# Patient Record
Sex: FEMALE | Race: OTHER | NOT HISPANIC OR LATINO | URBAN - METROPOLITAN AREA
[De-identification: names, ages, dates, MRNs, and addresses within clinical notes are randomized per-mention and may not be internally consistent; named-entity substitution may affect disease eponyms.]

---

## 2019-12-28 ENCOUNTER — EMERGENCY (EMERGENCY)
Facility: HOSPITAL | Age: 39
LOS: 1 days | Discharge: HOME | End: 2019-12-28
Attending: EMERGENCY MEDICINE | Admitting: EMERGENCY MEDICINE
Payer: MEDICAID

## 2019-12-28 VITALS
SYSTOLIC BLOOD PRESSURE: 129 MMHG | HEART RATE: 88 BPM | RESPIRATION RATE: 18 BRPM | TEMPERATURE: 99 F | DIASTOLIC BLOOD PRESSURE: 88 MMHG | OXYGEN SATURATION: 98 %

## 2019-12-28 VITALS
RESPIRATION RATE: 15 BRPM | OXYGEN SATURATION: 99 % | WEIGHT: 182.98 LBS | HEIGHT: 61 IN | DIASTOLIC BLOOD PRESSURE: 92 MMHG | SYSTOLIC BLOOD PRESSURE: 144 MMHG | TEMPERATURE: 99 F | HEART RATE: 83 BPM

## 2019-12-28 LAB
ALBUMIN SERPL ELPH-MCNC: 3.7 G/DL — SIGNIFICANT CHANGE UP (ref 3.4–5)
ALP SERPL-CCNC: 88 U/L — SIGNIFICANT CHANGE UP (ref 40–120)
ALT FLD-CCNC: 60 U/L — HIGH (ref 12–42)
ANION GAP SERPL CALC-SCNC: 9 MMOL/L — SIGNIFICANT CHANGE UP (ref 9–16)
APPEARANCE UR: CLEAR — SIGNIFICANT CHANGE UP
APTT BLD: 36.5 SEC — HIGH (ref 27.5–36.3)
AST SERPL-CCNC: 38 U/L — HIGH (ref 15–37)
BASOPHILS # BLD AUTO: 0.07 K/UL — SIGNIFICANT CHANGE UP (ref 0–0.2)
BASOPHILS NFR BLD AUTO: 0.7 % — SIGNIFICANT CHANGE UP (ref 0–2)
BILIRUB SERPL-MCNC: 0.8 MG/DL — SIGNIFICANT CHANGE UP (ref 0.2–1.2)
BILIRUB UR-MCNC: NEGATIVE — SIGNIFICANT CHANGE UP
BUN SERPL-MCNC: 8 MG/DL — SIGNIFICANT CHANGE UP (ref 7–23)
CALCIUM SERPL-MCNC: 8.7 MG/DL — SIGNIFICANT CHANGE UP (ref 8.5–10.5)
CHLORIDE SERPL-SCNC: 104 MMOL/L — SIGNIFICANT CHANGE UP (ref 96–108)
CO2 SERPL-SCNC: 26 MMOL/L — SIGNIFICANT CHANGE UP (ref 22–31)
COLOR SPEC: YELLOW — SIGNIFICANT CHANGE UP
CREAT SERPL-MCNC: 0.81 MG/DL — SIGNIFICANT CHANGE UP (ref 0.5–1.3)
DIFF PNL FLD: ABNORMAL
EOSINOPHIL # BLD AUTO: 0.15 K/UL — SIGNIFICANT CHANGE UP (ref 0–0.5)
EOSINOPHIL NFR BLD AUTO: 1.6 % — SIGNIFICANT CHANGE UP (ref 0–6)
GLUCOSE SERPL-MCNC: 101 MG/DL — HIGH (ref 70–99)
GLUCOSE UR QL: NEGATIVE — SIGNIFICANT CHANGE UP
HCG SERPL-ACNC: 686 MIU/ML — HIGH
HCT VFR BLD CALC: 39.5 % — SIGNIFICANT CHANGE UP (ref 34.5–45)
HGB BLD-MCNC: 13.2 G/DL — SIGNIFICANT CHANGE UP (ref 11.5–15.5)
IMM GRANULOCYTES NFR BLD AUTO: 0.4 % — SIGNIFICANT CHANGE UP (ref 0–1.5)
INR BLD: 1.17 — HIGH (ref 0.88–1.16)
KETONES UR-MCNC: NEGATIVE — SIGNIFICANT CHANGE UP
LEUKOCYTE ESTERASE UR-ACNC: NEGATIVE — SIGNIFICANT CHANGE UP
LYMPHOCYTES # BLD AUTO: 2.81 K/UL — SIGNIFICANT CHANGE UP (ref 1–3.3)
LYMPHOCYTES # BLD AUTO: 29.5 % — SIGNIFICANT CHANGE UP (ref 13–44)
MCHC RBC-ENTMCNC: 27 PG — SIGNIFICANT CHANGE UP (ref 27–34)
MCHC RBC-ENTMCNC: 33.4 GM/DL — SIGNIFICANT CHANGE UP (ref 32–36)
MCV RBC AUTO: 80.8 FL — SIGNIFICANT CHANGE UP (ref 80–100)
MONOCYTES # BLD AUTO: 0.82 K/UL — SIGNIFICANT CHANGE UP (ref 0–0.9)
MONOCYTES NFR BLD AUTO: 8.6 % — SIGNIFICANT CHANGE UP (ref 2–14)
NEUTROPHILS # BLD AUTO: 5.64 K/UL — SIGNIFICANT CHANGE UP (ref 1.8–7.4)
NEUTROPHILS NFR BLD AUTO: 59.2 % — SIGNIFICANT CHANGE UP (ref 43–77)
NITRITE UR-MCNC: NEGATIVE — SIGNIFICANT CHANGE UP
NRBC # BLD: 0 /100 WBCS — SIGNIFICANT CHANGE UP (ref 0–0)
PH UR: 6 — SIGNIFICANT CHANGE UP (ref 5–8)
PLATELET # BLD AUTO: 253 K/UL — SIGNIFICANT CHANGE UP (ref 150–400)
POTASSIUM SERPL-MCNC: 3.9 MMOL/L — SIGNIFICANT CHANGE UP (ref 3.5–5.3)
POTASSIUM SERPL-SCNC: 3.9 MMOL/L — SIGNIFICANT CHANGE UP (ref 3.5–5.3)
PROT SERPL-MCNC: 8.1 G/DL — SIGNIFICANT CHANGE UP (ref 6.4–8.2)
PROT UR-MCNC: NEGATIVE MG/DL — SIGNIFICANT CHANGE UP
PROTHROM AB SERPL-ACNC: 13.1 SEC — HIGH (ref 10–12.9)
RBC # BLD: 4.89 M/UL — SIGNIFICANT CHANGE UP (ref 3.8–5.2)
RBC # FLD: 15.4 % — HIGH (ref 10.3–14.5)
SODIUM SERPL-SCNC: 139 MMOL/L — SIGNIFICANT CHANGE UP (ref 132–145)
SP GR SPEC: 1.02 — SIGNIFICANT CHANGE UP (ref 1–1.03)
UROBILINOGEN FLD QL: 0.2 E.U./DL — SIGNIFICANT CHANGE UP
WBC # BLD: 9.53 K/UL — SIGNIFICANT CHANGE UP (ref 3.8–10.5)
WBC # FLD AUTO: 9.53 K/UL — SIGNIFICANT CHANGE UP (ref 3.8–10.5)

## 2019-12-28 PROCEDURE — 99284 EMERGENCY DEPT VISIT MOD MDM: CPT

## 2019-12-28 PROCEDURE — 76815 OB US LIMITED FETUS(S): CPT | Mod: 26

## 2019-12-28 PROCEDURE — 76817 TRANSVAGINAL US OBSTETRIC: CPT | Mod: 26

## 2019-12-28 PROCEDURE — 76770 US EXAM ABDO BACK WALL COMP: CPT | Mod: 26

## 2019-12-28 RX ORDER — ACETAMINOPHEN WITH CODEINE 300MG-30MG
1 TABLET ORAL
Qty: 12 | Refills: 0
Start: 2019-12-28

## 2019-12-28 RX ORDER — SODIUM CHLORIDE 9 MG/ML
2000 INJECTION INTRAMUSCULAR; INTRAVENOUS; SUBCUTANEOUS ONCE
Refills: 0 | Status: COMPLETED | OUTPATIENT
Start: 2019-12-28 | End: 2019-12-28

## 2019-12-28 RX ORDER — ACETAMINOPHEN 500 MG
650 TABLET ORAL ONCE
Refills: 0 | Status: COMPLETED | OUTPATIENT
Start: 2019-12-28 | End: 2019-12-28

## 2019-12-28 RX ADMIN — Medication 650 MILLIGRAM(S): at 11:51

## 2019-12-28 RX ADMIN — SODIUM CHLORIDE 2000 MILLILITER(S): 9 INJECTION INTRAMUSCULAR; INTRAVENOUS; SUBCUTANEOUS at 11:51

## 2019-12-28 RX ADMIN — Medication 650 MILLIGRAM(S): at 13:38

## 2019-12-28 RX ADMIN — SODIUM CHLORIDE 2000 MILLILITER(S): 9 INJECTION INTRAMUSCULAR; INTRAVENOUS; SUBCUTANEOUS at 13:38

## 2019-12-28 NOTE — ED PROVIDER NOTE - PROGRESS NOTE DETAILS
Pt left message asking for rx for cpap supplies.  Order was placed on 2/07/17.  Pt was asked to call back with information of DME.    ROSALBA Ramsey       I discussed this case with the OB attending at [facility].  She advises pt return in 48 hours for a beta check.

## 2019-12-28 NOTE — ED PROVIDER NOTE - NSFOLLOWUPINSTRUCTIONS_ED_ALL_ED_FT
RETURN HERE ON MONDAY (48 HOURS) FOR A REPEAT BETA CHECK AND A REPEAT ULTRASOUND.  THESE TESTS NEED TO BE PERFORMED ON MONDAY TO RULE OUT AN ECTOPIC PREGNANCY.      PLEASE RETURN TO THE ER IMMEDIATELY OR CALL 911 FOR ANY HIGH FEVER, HEAVY BLEEDING, TROUBLE BREATHING, VOMITING, SEVERE PAIN, OR ANY OTHER CONCERNS.

## 2019-12-28 NOTE — ED ADULT NURSE NOTE - NSIMPLEMENTINTERV_GEN_ALL_ED
Implemented All Universal Safety Interventions:  [place] to call system. Call bell, personal items and telephone within reach. Instruct patient to call for assistance. Room bathroom lighting operational. Non-slip footwear when patient is off stretcher. Physically safe environment: no spills, clutter or unnecessary equipment. Stretcher in lowest position, wheels locked, appropriate side rails in place.

## 2019-12-28 NOTE — ED PROVIDER NOTE - OBJECTIVE STATEMENT
38 y/o female with PMHx of kidney stones, , presents to the ED with complaints of dull, constant RLQ abdominal pain x 1 day. Pt states the pain worsened throughout the night but was able to pass gas. Today, Pt states when wiping after urination, she noticed blood on the toilet paper, which prompted her to go to [facility]. Pt was sent to ED for further evaluation. Pt is approximately 4 weeks and 5 days pregnant according to dates. [facility] 2019 with positive pregnancy test.  Denies fever, chills, chest pain, SOB, N/V/D, dysuria, urinary frequency. 40 y/o female with PMHx of kidney stones, , presents to the ED with complaints of dull, constant RLQ abdominal pain x 1 day. Pt indicates area of R pelvis as area of worst pain.  Pt states the pain worsened throughout the night but was able to pass gas, which relieved sxs somewhat. Today, Pt states when wiping after urination, she noticed blood on the toilet paper, which prompted her to go to [facility]. Pt was sent to ED for further evaluation. Pt is approximately 4 weeks and 5 days pregnant according to dates. [facility] 2019 with positive pregnancy test.  Denies fever, chills, chest pain, SOB, N/V/D, dysuria, urinary frequency.

## 2019-12-28 NOTE — ED ADULT TRIAGE NOTE - CHIEF COMPLAINT QUOTE
patient here with +pregnancy test and RLQ abd pain, tenderness and blood in urine x1 day; sent by [facility] for eval; LMP 11/25/2019

## 2019-12-28 NOTE — ED PROVIDER NOTE - PHYSICAL EXAMINATION
VITAL SIGNS: I have reviewed nursing notes and confirm.  CONSTITUTIONAL: Well-developed; well-nourished; in no acute distress.  SKIN: Skin is warm and dry, no acute rash.  HEAD: Normocephalic; atraumatic.  EYES: PERRL, EOM intact; conjunctiva and sclera clear.  ENT: No nasal discharge; airway clear.  NECK: Supple; non tender.  CARD: S1, S2 normal; no murmurs, gallops, or rubs. Regular rate and rhythm.  RESP: No wheezes, rales or rhonchi.  ABD: Normal bowel sounds; soft; +TTP RLQ. No distension, no rebound. No CVAT.   EXT: Normal ROM. No clubbing, cyanosis or edema.  NEURO: Alert, oriented. Grossly unremarkable.  PSYCH: Cooperative, appropriate. VITAL SIGNS: I have reviewed nursing notes and confirm.  CONSTITUTIONAL: Well-developed; well-nourished; in no acute distress.  SKIN: Skin is warm and dry, no acute rash.  HEAD: Normocephalic; atraumatic.  EYES: PERRL, EOM intact; conjunctiva and sclera clear.  ENT: No nasal discharge; airway clear.  NECK: Supple; non tender.  CARD: S1, S2 normal; no murmurs, gallops, or rubs. Regular rate and rhythm.  RESP: No wheezes, rales or rhonchi.  ABD: Normal bowel sounds; soft; +TTP R adnexal area, below McBurney's point. No distension, no rebound. No CVAT.   EXT: Normal ROM. No clubbing, cyanosis or edema.  NEURO: Alert, oriented. Grossly unremarkable.  PSYCH: Cooperative, appropriate.

## 2019-12-28 NOTE — ED PROVIDER NOTE - CLINICAL SUMMARY MEDICAL DECISION MAKING FREE TEXT BOX
R pelvic pain - kidney stone vs ectopic vs miscarriage vs UTI.  WIll check labs, pelvic US, renal US, UA, and re-evaluate. Appy is less likely given location of pain.     On re-evaluation pt appears better and exam is improved.  I discussed all results and potential dx - ectopic, impending miscarriage, and early appy.  I gave strict return precautions and bleeding precautions.  She will return in 48 hours for repeat beta check and US.

## 2019-12-28 NOTE — ED PROVIDER NOTE - PATIENT PORTAL LINK FT
You can access the FollowMyHealth Patient Portal offered by [facility] by registering at the following website: [email or web address]. By joining [employer]’s FollowMyHealth portal, you will also be able to view your health information using other applications (apps) compatible with our system.

## 2019-12-30 ENCOUNTER — EMERGENCY (EMERGENCY)
Facility: HOSPITAL | Age: 39
LOS: 1 days | Discharge: HOME | End: 2019-12-30
Attending: EMERGENCY MEDICINE | Admitting: EMERGENCY MEDICINE
Payer: MEDICAID

## 2019-12-30 VITALS
DIASTOLIC BLOOD PRESSURE: 85 MMHG | WEIGHT: 186.07 LBS | TEMPERATURE: 99 F | OXYGEN SATURATION: 98 % | SYSTOLIC BLOOD PRESSURE: 129 MMHG | RESPIRATION RATE: 16 BRPM | HEIGHT: 61 IN | HEART RATE: 106 BPM

## 2019-12-30 LAB — HCG SERPL-ACNC: 1560 MIU/ML — HIGH

## 2019-12-30 PROCEDURE — 99284 EMERGENCY DEPT VISIT MOD MDM: CPT

## 2019-12-30 NOTE — ED ADULT NURSE NOTE - CHPI ED NUR SYMPTOMS NEG
no weakness/no dizziness/no decreased eating/drinking/no pain/no vomiting/no nausea/no fever/no tingling/no chills

## 2019-12-30 NOTE — ED PROVIDER NOTE - NSFOLLOWUPINSTRUCTIONS_ED_ALL_ED_FT
Miscarriage    WHAT YOU NEED TO KNOW:    A miscarriage is the loss of a fetus within the first 20 weeks of pregnancy. A miscarriage may also be called a spontaneous  or an early pregnancy loss.     DISCHARGE INSTRUCTIONS:    Return to the emergency department if:     You have foul-smelling drainage or pus coming from your vagina.      You have heavy vaginal bleeding and soak 1 pad or more in an hour.       You have severe abdominal pain.      You feel like your heart is beating faster than normal.       You feel extremely weak or dizzy.     Contact your healthcare provider if:     You have a fever greater than 100.4°F or chills.       You have extreme sadness, grief, or feel unable to cope with what has happened.       You have questions or concerns about your condition or care.    Self-care:     Do not put anything in your vagina for 2 weeks or as directed. Do not use tampons, douche, or have sex. These actions can cause infection and pain.       Use sanitary pads as needed. You may have light bleeding or spotting for 2 weeks.       Do not take a bath or go swimming for 2 weeks or as directed. These actions may increase your risk for an infection. Take showers only.       Rest as needed. Slowly start to do more each day. Return to your daily activities as directed.       Talk to your healthcare provider about birth control. If you would like to prevent another pregnancy, ask your healthcare provider which type of birth control is best for you.       Join a support group or therapy to help you cope. A miscarriage may be very difficult for you, your partner, and other members of your family. There is no right way to feel after a miscarriage. You may feel overwhelming grief or other emotions. It may be helpful to talk to a friend, family member, or counselor about your feelings. You may worry that you could have another miscarriage. Talk to your healthcare provider about your concerns. He may be able to help you reduce the risk for another miscarriage. He may also help you find ways to cope with grief.     For more information:     The [employer]  [address] [address]  [place] [address]  Phone: [phone]  Phone: 1-567.668.7842  Web Address: http://www.acog.org      [facility]  [address] [place] [address]  Web Address: [email or web address]      Follow up with your healthcare provider as directed: You may need to see your healthcare provider for blood tests or an ultrasound. Write down your questions so you remember to ask them during your visits.

## 2019-12-30 NOTE — ED PROVIDER NOTE - PATIENT PORTAL LINK FT
You can access the FollowMyHealth Patient Portal offered by [facility] by registering at the following website: [email or web address]. By joining [employer]’s FollowMyHealth portal, you will also be able to view your health information using other applications (apps) compatible with our system.

## 2019-12-30 NOTE — ED PROVIDER NOTE - OBJECTIVE STATEMENT
38 y/o F (, one , 0 miscarriages) with PMHx of kidney stones presents to the ED for a followup evaluation. Pt reports she had spotting one month ago in November and believes she is 4 weeks pregnant. She was seen at this ED 2 days ago for LLQ Abd pain and spotting and was found to have an HCG level of 600 with US imaging that showed a dysmorphic gestational sac. Pt was instructed to return to the ED today for repeat lab work. Pt states her Abd pain and spotting has resolved. She denies any new medical complaints.

## 2019-12-31 PROBLEM — N20.0 CALCULUS OF KIDNEY: Chronic | Status: ACTIVE | Noted: 2019-12-28

## 2020-01-01 ENCOUNTER — EMERGENCY (EMERGENCY)
Facility: HOSPITAL | Age: 40
LOS: 1 days | Discharge: HOME | End: 2020-01-01
Admitting: EMERGENCY MEDICINE
Payer: MEDICAID

## 2020-01-01 VITALS
SYSTOLIC BLOOD PRESSURE: 150 MMHG | DIASTOLIC BLOOD PRESSURE: 98 MMHG | HEART RATE: 111 BPM | HEIGHT: 61 IN | RESPIRATION RATE: 18 BRPM | OXYGEN SATURATION: 98 % | WEIGHT: 186.07 LBS | TEMPERATURE: 98 F

## 2020-01-01 VITALS
TEMPERATURE: 98 F | DIASTOLIC BLOOD PRESSURE: 90 MMHG | OXYGEN SATURATION: 98 % | HEART RATE: 109 BPM | SYSTOLIC BLOOD PRESSURE: 135 MMHG | RESPIRATION RATE: 18 BRPM

## 2020-01-01 LAB — HCG SERPL-ACNC: 3419 MIU/ML — HIGH

## 2020-01-01 PROCEDURE — 76801 OB US < 14 WKS SINGLE FETUS: CPT | Mod: 26

## 2020-01-01 PROCEDURE — 99284 EMERGENCY DEPT VISIT MOD MDM: CPT

## 2020-01-01 PROCEDURE — 76817 TRANSVAGINAL US OBSTETRIC: CPT | Mod: 26

## 2020-01-01 NOTE — ED PROVIDER NOTE - CLINICAL SUMMARY MEDICAL DECISION MAKING FREE TEXT BOX
Pt. was initially seen @ [facility] on 12/28 for + preg test at home, with mild abd pain , and spotting, BHCg  that day was 686 no IUP on US. Pt. returned on 12/30 repeat bhcg that day was 1560. Today pt. here for repeat BHcG and US. Pt. denies any abd pain , no vaginal bleeding, no Ua sx, no fever/chills, no N/V. pt. hrt rate 11, believe anxious well appearing, abd exam nl, UA now with IUP BHCg increase to 3419. recommend to come to ER in 4 days for repeat US and labs if unable to reach ob.

## 2020-01-01 NOTE — ED PROVIDER NOTE - PATIENT PORTAL LINK FT
You can access the FollowMyHealth Patient Portal offered by [facility] by registering at the following website: [email or web address]. By joining [employer]’s FollowMyHealth portal, you will also be able to view your health information using other applications (apps) compatible with our system.

## 2020-01-01 NOTE — ED PROVIDER NOTE - NSFOLLOWUPINSTRUCTIONS_ED_ALL_ED_FT
Pregnancy    WHAT YOU NEED TO KNOW:    A normal pregnancy lasts about 40 weeks. The first trimester lasts from your last period through the 12th week of pregnancy. The second trimester lasts from the 13th week through the 23rd week. The third trimester lasts from the 24th week until your baby is born. If you know the date of your last period, your healthcare provider can estimate your due date. You may give birth to your baby any time from 37 weeks to 2 weeks after your due date.    DISCHARGE INSTRUCTIONS:    Return to the emergency department if:     You develop a severe headache that does not go away.      You have new or increased vision changes, such as blurred or spotted vision.      You have new or increased swelling in your face or hands.      You have pain or cramping in your abdomen or low back.      You have vaginal bleeding.    Call your doctor or obstetrician if:     You have abdominal cramps, pressure, or tightening.      You have a change in vaginal discharge.      You cannot keep food or drinks down, and you are losing weight.      You have chills or a fever.      You have vaginal itching, burning, or pain.      You have yellow, green, white, or foul-smelling vaginal discharge.      You have pain or burning when you urinate, less urine than usual, or pink or bloody urine.      You have questions or concerns about your condition or care.    Medicines:     Prenatal vitamins provide some of the extra vitamins and minerals you need during pregnancy. Prenatal vitamins may also help to decrease the risk for certain birth defects.      Take your medicine as directed. Contact your healthcare provider if you think your medicine is not helping or if you have side effects. Tell him or her if you are allergic to any medicine. Keep a list of the medicines, vitamins, and herbs you take. Include the amounts, and when and why you take them. Bring the list or the pill bottles to follow-up visits. Carry your medicine list with you in case of an emergency.    Follow up with your doctor or obstetrician as directed: Go to all of your prenatal visits during your pregnancy. Write down your questions so you remember to ask them during your visits.    Prenatal care: Prenatal care is a series of visits with your healthcare provider throughout your pregnancy. Prenatal care can help prevent problems during pregnancy and childbirth. At each prenatal visit, your provider will weigh you and check your blood pressure. He or she will also check your baby's heartbeat and growth. You may also need the following at some visits:     A pelvic exam allows your healthcare provider to see your cervix (the bottom part of your uterus). Your healthcare provider will use a speculum to open your vagina. He or she will check the size and shape of your uterus. At your first prenatal visit, you may also have a Pap smear. This is a test to check your cervix for abnormal cells.      Blood tests may be done to check for any of the following:   Gestational diabetes or anemia (low iron level)      Blood type or Rh factor, or certain birth defects      Immunity to certain diseases, such as chickenpox or rubella      An infection, such as a sexually transmitted infection, HIV, or hepatitis B      Hepatitis B may need to be prevented or treated. Hepatitis B is inflammation of the liver caused by the hepatitis B virus (HBV). HBV can spread from a mother to her baby during delivery. You will be checked for HBV as early as possible in the first trimester of each pregnancy. You need the test even if you received the hepatitis B vaccine or were tested before. You may need to have an HBV infection treated before you give birth.      Urine tests may also be done to check for sugar and protein. These can be signs of gestational diabetes or preeclampsia. Urine tests may also be done to check for signs of infection.      A fetal ultrasound shows pictures of your baby inside your uterus. The pictures are used to check your baby's development, movement, and position.      Genetic disorder screening tests may be offered to you. These tests check your baby's risk for genetic disorders such as Down syndrome. A screening test includes a blood test and ultrasound.    Body changes that may occur during your pregnancy:     Breast changes you will experience include tenderness and tingling during the early part of your pregnancy. Your breasts will become larger. You may need to use a support bra. You may see a thin, yellow fluid, called colostrum, leak from your nipples during the second trimester. Colostrum is a liquid that changes to milk about 3 days after you give birth.      Skin changes and stretch marks may occur during your pregnancy. You may have red marks, called stretch marks, on your skin. Stretch marks will usually fade after pregnancy. Use lotion if your skin is dry and itchy. The skin on your face, around your nipples, and below your belly button may darken. Most of the time, your skin will return to its normal color after your baby is born.      Morning sickness is nausea and vomiting that can happen at any time of day. Avoid fatty and spicy foods. Eat small meals throughout the day instead of large meals. Liliya may help to decrease nausea. Ask your healthcare provider about other ways of decreasing nausea and vomiting.      Heartburn may be caused by changes in your hormones during pregnancy. Your growing uterus may also push your stomach upward and force stomach acid to back up into your esophagus. Eat 4 or 5 small meals each day instead of large meals. Avoid spicy foods. Avoid eating right before bedtime.      Constipation may develop during your pregnancy. To treat constipation, eat foods high in fiber such as fiber cereals, beans, fruits, vegetables, whole-grain breads, and prune juice. Get regular exercise and drink plenty of water. Your healthcare provider may also suggest a fiber supplement to soften your bowel movements. Talk to your healthcare provider before you use any medicines to decrease constipation.      Hemorrhoids are enlarged veins in the rectal area. They may cause pain, itching, and bright red bleeding from your rectum. To decrease your risk for hemorrhoids, prevent constipation and do not strain to have a bowel movement. If you have hemorrhoids, soak in a tub of warm water to ease discomfort. Ask your healthcare provider how you can treat hemorrhoids.      Leg cramps and swelling may be caused by low calcium levels or the added weight of pregnancy. Raise your legs above the level of your heart to decrease swelling. During a leg cramp, stretch or massage the muscle that has the cramp. Heat may help decrease pain and muscle spasms. Apply heat on your muscle for 20 to 30 minutes every 2 hours for as many days as directed.      Back pain may occur as your baby grows. Do not stand for long periods of time or lift heavy items. Use good posture while you stand, squat, or bend. Wear low-heeled shoes with good support. Rest may also help to relieve back pain. Ask your healthcare provider about exercises you can do to strengthen your back muscles.    Stay healthy during your pregnancy:     Eat a variety of healthy foods. Healthy foods include fruits, vegetables, whole-grain breads, low-fat dairy foods, beans, lean meats, and fish. Drink liquids as directed. Ask how much liquid to drink each day and which liquids are best for you. Limit caffeine to less than 200 milligrams each day. Limit your intake of fish to 2 servings each week. Choose fish low in mercury such as canned light tuna, shrimp, crab, salmon, cod, or tilapia. Do not eat fish high in mercury such as swordfish, tilefish, jassi mackerel, and shark.      Take prenatal vitamins as directed. Your need for certain vitamins and minerals, such as folic acid, increases during pregnancy. Prenatal vitamins provide some of the extra vitamins and minerals you need. Prenatal vitamins may also help to decrease the risk for certain birth defects.      Ask how much weight you should gain during your pregnancy. Too much or too little weight gain can be unhealthy for you and your baby.      Talk to your healthcare provider about exercise. Moderate exercise can help you stay fit. Your healthcare provider will help you plan an exercise program that is safe for you during pregnancy.       Do not smoke. Smoking increases your risk for a miscarriage and heart and blood vessel problems. Smoking can cause your baby to be born too early or weigh less at birth. Quit smoking as soon as you think you might be pregnant. Ask your healthcare provider for information if you need help quitting.      Do not drink alcohol. Alcohol passes from your body to your baby through the placenta. It can affect your baby's brain development and cause fetal alcohol syndrome (FAS). FAS is a group of conditions that causes mental, behavior, and growth problems.      Talk to your healthcare provider before you take any medicines. Many medicines may harm your baby if you take them when you are pregnant. Do not take any medicines, vitamins, herbs, or supplements without first talking to your healthcare provider. Never use illegal or street drugs (such as marijuana or cocaine) while you are pregnant.    Safety tips:     Avoid hot tubs and saunas. Do not use a hot tub or sauna while you are pregnant, especially during your first trimester. Hot tubs and saunas may raise your baby's temperature and increase the risk for birth defects.      Avoid toxoplasmosis. This is an infection caused by eating raw meat or being around infected cat feces. It can cause birth defects, miscarriages, and other problems. Wash your hands after you touch raw meat. Make sure any meat is well-cooked before you eat it. Avoid raw eggs and unpasteurized milk. Use gloves or ask someone else to clean your cat's litter box while you are pregnant.      Ask your healthcare provider about travel. The most comfortable time to travel is during the second trimester. Ask your healthcare provider if you can travel after 36 weeks. You may not be able to travel in an airplane after 36 weeks. He may also recommend that you avoid long road trips.

## 2020-01-01 NOTE — ED PROVIDER NOTE - CHPI ED SYMPTOMS NEG
no back pain/no pain/no nausea/no chills/no fever/no abdominal pain/no discharge/no dysuria/no vaginal discharge/no vomiting

## 2020-01-01 NOTE — ED PROVIDER NOTE - OBJECTIVE STATEMENT
38 y/o F (, one , 0 miscarriages) with PMHx of kidney stones presents to the ED for a followup evaluation. Pt. was initially seen @ [facility] on  for + preg test at home, with mild abd pain , and spotting, BHCg  that day was 686 no IUP on US. Pt. returned on  repeat bhcg that day was 1560. Today pt. here for repeat BHcG and US. Pt. denies any abd pain , no vaginal bleeding, no Ua sx, no fever/chills, no N/V.

## 2020-01-01 NOTE — ED ADULT NURSE NOTE - NSIMPLEMENTINTERV_GEN_ALL_ED
Implemented All Universal Safety Interventions:  [place] to call system. Call bell, personal items and telephone within reach. Instruct patient to call for assistance. Room bathroom lighting operational. Non-slip footwear when patient is off stretcher. Physically safe environment: no spills, clutter or unnecessary equipment. Stretcher in lowest position, wheels locked, appropriate side rails in place.

## 2020-01-03 DIAGNOSIS — R10.31 RIGHT LOWER QUADRANT PAIN: ICD-10-CM

## 2020-01-03 DIAGNOSIS — Z3A.01 LESS THAN 8 WEEKS GESTATION OF PREGNANCY: ICD-10-CM

## 2020-01-03 DIAGNOSIS — O26.891 OTHER SPECIFIED PREGNANCY RELATED CONDITIONS, FIRST TRIMESTER: ICD-10-CM

## 2020-01-05 ENCOUNTER — EMERGENCY (EMERGENCY)
Facility: HOSPITAL | Age: 40
LOS: 1 days | Discharge: HOME | End: 2020-01-05
Attending: EMERGENCY MEDICINE | Admitting: EMERGENCY MEDICINE
Payer: MEDICAID

## 2020-01-05 VITALS
HEART RATE: 106 BPM | WEIGHT: 186.07 LBS | TEMPERATURE: 98 F | RESPIRATION RATE: 18 BRPM | HEIGHT: 61 IN | DIASTOLIC BLOOD PRESSURE: 86 MMHG | SYSTOLIC BLOOD PRESSURE: 148 MMHG | OXYGEN SATURATION: 100 %

## 2020-01-05 LAB — HCG SERPL-ACNC: 8384 MIU/ML — HIGH

## 2020-01-05 PROCEDURE — 76815 OB US LIMITED FETUS(S): CPT | Mod: 26

## 2020-01-05 PROCEDURE — 76801 OB US < 14 WKS SINGLE FETUS: CPT | Mod: 26

## 2020-01-05 PROCEDURE — 99284 EMERGENCY DEPT VISIT MOD MDM: CPT

## 2020-01-05 PROCEDURE — 76817 TRANSVAGINAL US OBSTETRIC: CPT | Mod: 26

## 2020-01-05 RX ORDER — ACETAMINOPHEN 500 MG
650 TABLET ORAL ONCE
Refills: 0 | Status: COMPLETED | OUTPATIENT
Start: 2020-01-05 | End: 2020-01-05

## 2020-01-05 RX ADMIN — Medication 650 MILLIGRAM(S): at 19:35

## 2020-01-05 NOTE — ED PROVIDER NOTE - PATIENT PORTAL LINK FT
You can access the FollowMyHealth Patient Portal offered by [facility] by registering at the following website: [email or web address]. By joining [employer]’s FollowMyHealth portal, you will also be able to view your health information using other applications (apps) compatible with our system.

## 2020-01-05 NOTE — ED ADULT TRIAGE NOTE - CHIEF COMPLAINT QUOTE
pt instructed to come back today for f/u ultrasound to r/o ectopic. has been here multiple times this week for hcg bloodwork which are trending up  but here for us. denies pain or bleeding.

## 2020-01-05 NOTE — ED PROVIDER NOTE - OBJECTIVE STATEMENT
38 y/o F (, one , 0 miscarriages) with PMHx of kidney stones presents to the ED for a followup evaluation. Pt. was initially seen @ [facility] on  for + preg test at home, with mild abd pain , and spotting, BHCg  that day was 686 no IUP on US. Pt. returned on  repeat bhcg that day was 1560. Patient was also seen on  for US and beta, beta had doubled. Patient is back again today as she was told to return today for repeat blood work and US.     Patient states she has a gyn but cannot get an appointment until end of february. She is in the process of trying to find a new doctor. Patient denies vaginal bleeding, discharge, abdominal pain. Patient reports that she has a dull headache and is asking for tylenol.

## 2020-01-05 NOTE — ED ADULT NURSE REASSESSMENT NOTE - NS ED NURSE REASSESS COMMENT FT1
pt care handed over to myself, introduced self to patient, spoke with dr Chavira who is going to dc pt, pt aware,  will come and talk with t prior to dc

## 2020-01-05 NOTE — ED PROVIDER NOTE - NSFOLLOWUPINSTRUCTIONS_ED_ALL_ED_FT
F/u with your OB       [employer]® CareNotes®     :  [facility]             PREGNANCY - General Information     Pregnancy    WHAT YOU NEED TO KNOW:    What do I need to know about pregnancy? A normal pregnancy lasts about 40 weeks. The first trimester lasts from your last period through the 12th week of pregnancy. The second trimester lasts from the 13th week through the 23rd week. The third trimester lasts from the 24th week until your baby is born. If you know the date of your last period, your healthcare provider can estimate your due date. You may give birth to your baby any time from 37 weeks to 2 weeks after your due date.    What is prenatal care? Prenatal care is a series of visits with your healthcare provider throughout your pregnancy. Prenatal care can help prevent problems during pregnancy and childbirth. At each prenatal visit, your healthcare provider will weigh you and check your blood pressure. He or she will also check your baby's heartbeat and growth. You may need the following at some visits:     A pelvic exam allows your healthcare provider to see your cervix (the bottom part of your uterus). Your provider will use a speculum to open your vagina. He or she will check the size and shape of your uterus. At your first prenatal visit, you may also have a Pap smear. This is a test to check your cervix for abnormal cells.      Blood tests may be done to check for any of the following:   Gestational diabetes or anemia (low iron level)      Blood type or Rh factor, or certain birth defects      Immunity to certain diseases, such as chickenpox or rubella      An infection, such as a sexually transmitted infection, HIV, or hepatitis B      Hepatitis B may need to be prevented or treated. Hepatitis B is inflammation of the liver caused by the hepatitis B virus (HBV). HBV can spread from a mother to her baby during delivery. You will be checked for HBV as early as possible in the first trimester of each pregnancy. You need the test even if you received the hepatitis B vaccine or were tested before. You may need to have an HBV infection treated before you give birth.      Urine tests may also be done to check for sugar and protein. These can be signs of gestational diabetes or preeclampsia. Urine tests may also be done to check for signs of infection.      A fetal ultrasound shows pictures of your baby inside your uterus. The pictures are used to check your baby's development, movement, and position.      Genetic disorder screening tests may be offered to you. These tests check your baby's risk for genetic disorders such as Down syndrome. A screening test includes a blood test and ultrasound.    What can I do to have a healthy pregnancy?     Eat a variety of healthy foods. Healthy foods include fruits, vegetables, whole-grain breads, low-fat dairy foods, beans, lean meats, and fish. Drink liquids as directed. Ask how much liquid to drink each day and which liquids are best for you. Limit caffeine to less than 200 milligrams each day. Limit your intake of fish to 2 servings each week. Choose fish low in mercury such as canned light tuna, shrimp, crab, salmon, cod, or tilapia. Do not eat fish high in mercury such as swordfish, tilefish, jassi mackerel, and shark.      Take prenatal vitamins as directed. Your need for certain vitamins and minerals, such as folic acid, increases during pregnancy. Prenatal vitamins provide some of the extra vitamins and minerals you need. Prenatal vitamins may also help to decrease the risk for certain birth defects.      Ask how much weight you should gain during your pregnancy. Too much or too little weight gain can be unhealthy for you and your baby.      Talk to your healthcare provider about exercise. Moderate exercise can help you stay fit. Your healthcare provider will help you plan an exercise program that is safe for you during pregnancy.       Do not smoke. Smoking increases your risk for a miscarriage and heart and blood vessel problems. Smoking can cause your baby to be born too early or weigh less at birth. Quit smoking as soon as you think you might be pregnant. Ask your healthcare provider for information if you need help quitting.      Do not drink alcohol. Alcohol passes from your body to your baby through the placenta. It can affect your baby's brain development and cause fetal alcohol syndrome (FAS). FAS is a group of conditions that causes mental, behavior, and growth problems.      Talk to your healthcare provider before you take any medicines. Many medicines may harm your baby if you take them when you are pregnant. Do not take any medicines, vitamins, herbs, or supplements without first talking to your healthcare provider. Never use illegal or street drugs (such as marijuana or cocaine) while you are pregnant.    What body changes may happen during my pregnancy?     Breast changes you will experience include tenderness and tingling during the early part of your pregnancy. Your breasts will become larger. You may need to use a support bra. You may see a thin, yellow fluid, called colostrum, leak from your nipples during the second trimester. Colostrum is a liquid that changes to milk about 3 days after you give birth.      Skin changes and stretch marks may occur during your pregnancy. You may have red marks, called stretch marks, on your skin. Stretch marks will usually fade after pregnancy. Use lotion if your skin is dry and itchy. The skin on your face, around your nipples, and below your belly button may darken. Most of the time, your skin will return to its normal color after your baby is born.      Morning sickness is nausea and vomiting that can happen at any time of day. Avoid fatty and spicy foods. Eat small meals throughout the day instead of large meals. Liliya may help to decrease nausea. Ask your healthcare provider about other ways of decreasing nausea and vomiting.      Heartburn may be caused by changes in your hormones during pregnancy. Your growing uterus may also push your stomach upward and force stomach acid to back up into your esophagus. Eat 4 or 5 small meals each day instead of large meals. Avoid spicy foods. Avoid eating right before bedtime.      Constipation may develop during your pregnancy. To treat constipation, eat foods high in fiber such as fiber cereals, beans, fruits, vegetables, whole-grain breads, and prune juice. Get regular exercise and drink plenty of water. Your healthcare provider may also suggest a fiber supplement to soften your bowel movements. Talk to your healthcare provider before you use any medicines to decrease constipation.      Hemorrhoids are enlarged veins in the rectal area. They may cause pain, itching, and bright red bleeding from your rectum. To decrease your risk for hemorrhoids, prevent constipation and do not strain to have a bowel movement. If you have hemorrhoids, soak in a tub of warm water to ease discomfort. Ask your healthcare provider how you can treat hemorrhoids.      Leg cramps and swelling may be caused by low calcium levels or the added weight of pregnancy. Raise your legs above the level of your heart to decrease swelling. During a leg cramp, stretch or massage the muscle that has the cramp. Heat may help decrease pain and muscle spasms. Apply heat on your muscle for 20 to 30 minutes every 2 hours for as many days as directed.      Back pain may occur as your baby grows. Do not stand for long periods of time or lift heavy items. Use good posture while you stand, squat, or bend. Wear low-heeled shoes with good support. Rest may also help to relieve back pain. Ask your healthcare provider about exercises you can do to strengthen your back muscles.    What are some safety tips during pregnancy?     Avoid hot tubs and saunas. Do not use a hot tub or sauna while you are pregnant, especially during your first trimester. Hot tubs and saunas may raise your baby's temperature and increase the risk for birth defects.      Avoid toxoplasmosis. This is an infection caused by eating raw meat or being around infected cat feces. It can cause birth defects, miscarriages, and other problems. Wash your hands after you touch raw meat. Make sure any meat is well-cooked before you eat it. Avoid raw eggs and unpasteurized milk. Use gloves or ask someone else to clean your cat's litter box while you are pregnant.      Ask your healthcare provider about travel. The most comfortable time to travel is during the second trimester. Ask your healthcare provider if you can travel after 36 weeks. You may not be able to travel in an airplane after 36 weeks. He may also recommend that you avoid long road trips.    When should I seek immediate care?     You develop a severe headache that does not go away.      You have new or increased vision changes, such as blurred or spotted vision.      You have new or increased swelling in your face or hands.      You have pain or cramping in your abdomen or low back.      You have vaginal bleeding.    When should I call my doctor or obstetrician?     You have abdominal cramps, pressure, or tightening.      You have a change in vaginal discharge.      You cannot keep food or drinks down, and you are losing weight.      You have chills or a fever.      You have vaginal itching, burning, or pain.      You have yellow, green, white, or foul-smelling vaginal discharge.      You have pain or burning when you urinate, less urine than usual, or pink or bloody urine.      You have questions or concerns about your condition or care.    CARE AGREEMENT:    You have the right to help plan your care. Learn about your health condition and how it may be treated. Discuss treatment options with your healthcare providers to decide what care you want to receive. You always have the right to refuse treatment.        © Copyright [employer] 2020       back to top                      © Copyright [employer] 2020

## 2020-01-05 NOTE — ED PROVIDER NOTE - CLINICAL SUMMARY MEDICAL DECISION MAKING FREE TEXT BOX
US performed today demonstrates IUP. Beta 8,000s. Possible subchorionic hemorrhage seen. Patient to f/u with ob and establish care. Instructed to take prenatal vitamins.

## 2020-01-06 DIAGNOSIS — Z32.00 ENCOUNTER FOR PREGNANCY TEST, RESULT UNKNOWN: ICD-10-CM

## 2020-01-06 DIAGNOSIS — Z79.891 LONG TERM (CURRENT) USE OF OPIATE ANALGESIC: ICD-10-CM

## 2020-01-06 DIAGNOSIS — O20.0 THREATENED ABORTION: ICD-10-CM

## 2020-01-08 DIAGNOSIS — O09.529 SUPERVISION OF ELDERLY MULTIGRAVIDA, UNSPECIFIED TRIMESTER: ICD-10-CM

## 2020-01-08 DIAGNOSIS — O26.899 OTHER SPECIFIED PREGNANCY RELATED CONDITIONS, UNSPECIFIED TRIMESTER: ICD-10-CM

## 2020-01-08 DIAGNOSIS — R10.9 UNSPECIFIED ABDOMINAL PAIN: ICD-10-CM

## 2020-01-08 DIAGNOSIS — Z3A.00 WEEKS OF GESTATION OF PREGNANCY NOT SPECIFIED: ICD-10-CM

## 2020-01-10 DIAGNOSIS — R51 HEADACHE: ICD-10-CM

## 2020-01-10 DIAGNOSIS — Z33.1 PREGNANT STATE, INCIDENTAL: ICD-10-CM

## 2021-09-28 NOTE — ED ADULT NURSE NOTE - PAIN RATING/NUMBER SCALE (0-10): ACTIVITY
"Hospital/TCU/ED for chronic condition Discharge Protocol    \"Hi, my name is Esther Mccloud RN, a registered nurse, and I am calling from [facility].  I am calling to follow up and see how things are going for you after your recent emergency visit/hospital/TCU stay.\"    Tell me how you are doing now that you are home?\" I am hurting and swollen but seems to be getting better      Discharge Instructions    \"Let's review your discharge instructions.  What is/are the follow-up recommendations?  Pt. Response: [facility]    \"Has an appointment with your primary care provider been scheduled?\"   No (schedule appointment)    \"When you see the provider, I would recommend that you bring your medications with you.\"    Medications    \"Tell me what changed about your medicines when you discharged?\"    Changes to chronic meds?    2 or more - Epic MTM referral needed    \"What questions do you have about your medications?\"    None     New diagnoses of heart failure, COPD, diabetes, or MI?    No              Post Discharge Medication Reconciliation Status: discharge medications reconciled, continue medications without change.    Was MTM referral placed (*Make sure to put transitions as reason for referral)?   No    Call Summary    \"What questions or concerns do you have about your recent visit and your follow-up care?\"     none    \"If you have questions or things don't continue to improve, we encourage you contact us through the main clinic number (give number).  Even if the clinic is not open, triage nurses are available 24/7 to help you.     We would like you to know that our clinic has extended hours (provide information).  We also have urgent care (provide details on closest location and hours/contact info)\"      \"Thank you for your time and take care!\"             " 0

## 2022-04-09 NOTE — ED PROVIDER NOTE - TIMING
gradual onset PAST MEDICAL HISTORY:  COVID-19 vaccine series completed     DM (diabetes mellitus)     High cholesterol

## 2023-02-09 NOTE — ED ADULT NURSE NOTE - CHIEF COMPLAINT
The patient is a 39y Female complaining of Thalidomide Counseling: I discussed with the patient the risks of thalidomide including but not limited to birth defects, anxiety, weakness, chest pain, dizziness, cough and severe allergy.

## 2023-04-06 NOTE — ED ADULT TRIAGE NOTE - TEMPERATURE IN FAHRENHEIT (DEGREES F)
Occupational Therapy    Visit Type: initial evaluation  Treatment diagnosis: left TKA  Precautions:  Medical precautions:  fall risk; standard precautions.  S/p L TKA on 4/5  Lines:     Basic: capped IV      Lines in chart and on patient reviewed, precautions maintained throughout session.  Lower Extremity:    Left:  weight bearing: as tolerated.  knee immobilizer when out of bed and knee immobilizer until adequate quad strength      Pt had hinge brace on locked in extension; [facility] clarified that they will switch out the hinge brace for a knee immobilizer, which is only supposed to be used for femoral nerve block protocol   Safety Measures: bed alarm  SUBJECTIVE  Patient agreed to participate in therapy this date.  \"I can't walk, my knee is buckling\"Patient has not been hospitalized, in a skilled nursing facility, or seen by home health in the last 30 days.  Patient / Family Goal: return home     OBJECTIVE     Cognitive Status   Orientation    - Oriented to: person, place, time and situation  Functional Communication   - Overall Status: within functional limits   - Forms of Communication: verbal  Attention Span    - Attention: intact  Following Direction   - follows all commands and directions consistently    Patient Activity Tolerance: 1 to 1 activity to rest         Transfers  Assistive devices: 2-wheeled walker, gait belt, 1 person  - Sit to stand: minimal assist  - Stand to sit: minimal assist      Functional Ambulation  - Assistance: minimal assist  - Assistive device: 2-wheeled walker, gait belt and 1 person  Min assist with cues for sequencing and knee immobilizer. Continues to have mild left knee buckling even with knee  Immobilizer but improved with cues for sequencing small steps  Activities of Daily Living (ADLs)  Grooming/Oral Hygiene:   - Grooming assist: contact guard/touching/steadying assist  - Position: standing at sink  - Assist needed for: wash/dry hands  Toileting:   - Toilet transfer:        -  Assist: minimal assist       - Device: gait belt, 1 person and 2-wheeled walker  - Assist: supervision  Interventions    Training provided: ADL training, activity tolerance, balance retraining, bed mobility training, transfer training and safety training  Skilled input: verbal instruction/cues  Verbal Consent: Writer verbally educated and received verbal consent for hand placement, positioning of patient, and techniques to be performed today from patient for therapist position for techniques as described above and how they are pertinent to the patient's plan of care.         ASSESSMENT  Impairments: activity tolerance, pain, safety awareness and strength  Functional Limitations: functional transfers, showering, functional mobility, toileting and bathing    Patient seen on [facility] nursing unit. He presents below baseline which was modified independent  with  household mobility and ADL. Currently lives with spouse in a(n) house. For safe return to prior living situation the patient needs to be modified independent  for overall mobility and modified independent  for ADL.  Pt admitted for a LTKA. 14 day re-assessment date : 4/20/23.    POD: 1  PROCEDURE: left TKA  PRECAUTIONS: WBAT left lower extremity, immobilizer until pt demonstrates adequate quad strength d/t femoral nerve block    Occupational Therapy evaluation session today focused on functional mobility out of chair and up in to bathroom for toilet transfers, toileting and hygiene at sink. Pt continues to have knee immobilizer on left LE due to continued knee buckling. He is able to complete transfers and mobility with minimal assist and cues for sequencing steps with walker. Pt able to complete toilet transfer with minmial assist and standing at sink for hand hygiene with supervision to CGA. Pt returned to chair after session. Discharge to home will be dependent on progress during afternoon session.     Discharge Recommendations  Recommendation for  Discharge Location: OT WI: Home with Home therapy            PT/OT Mobility Equipment for Discharge: issue 2ww; added to dispense list     OT Identified Barriers to Discharge: knee buckling     • Skilled therapy is required to address these limitations in attempt to maximize the patient's independence.     • Personal Occupations Profile Affected: bathing/showering, functional mobility/transfers, lower body dressing, toileting/toilet hygiene, home establishment/managements     • Clinical decision making: Low - Patient has few limitations (1-3), comorbidities and/or complexities, as noted in problem focused assessment noted above, that impact their occupational profile.  Resulting in few treatment options and no task modification consistent with low clinical decision making complexity.    Education:   - Present and ready to learn: patient  Education provided during session:  - Results of above outlined education: Verbalizes understanding and No evidence of learning    Patient at End of Session:   Location: in chair  Safety measures: lines intact and call light within reach  Handoff to: nurse    PLAN  Suggestions for next session as indicated: lower body dressing with assistive equipment, car transfers    Frequency Comments: 4/6, M-F ORTHO (H/HT), LW           Interventions: activity tolerance training, compensatory technique education, ADL retraining, bed mobility training and functional transfer training  Agreement to plan and goals: patient agrees with goals and treatment plan      GOALS  Review Date: 4/20/2023  Long Term Goals: (to be met by time of discharge from hospital)  Grooming: Patient will complete grooming tasks in standing modified independent.  Lower body dressing: Patient will complete lower body dressing modified independent.  Toileting: Patient will complete toileting modified independent.  Toilet transfer: Patient will complete toilet transfer with modified independent.   Home setting transfer:  Patient will complete home setting transfers with modified independent.   Car transfer: Patient will complete car transfer with minimal assist.  Documented in the chart in the following areas: Pain. Assessment. Plan.      Admitting diagnosis: Primary osteoarthritis of left knee (M17.12);Arthritis of knee, left (M17.12)     Co-morbidities and problem list:   Patient Active Problem List:   Arthritis of knee, left    Treatment Diagnosis: left TKA    The referring provider's electronic signature on the evaluation authorizes the therapy plan of care and certifies the need for these services, furnished under this plan of care while under their care.      Therapy procedure time and total treatment time can be found documented on the Time Entry flowsheet   98

## 2023-11-06 NOTE — ED ADULT NURSE NOTE - PRIMARY CARE PROVIDER
pts own
Photo Preface (Leave Blank If You Do Not Want): Photographs were obtained today
Detail Level: Zone

## 2024-02-02 NOTE — ED PROVIDER NOTE - NSDCPRINTRESULTS_ED_ALL_ED
amphetamine-dextroamphetamine (ADDERALL) 30 MG tablet    Patient requests all Lab and Radiology Results on their Discharge Instructions

## 2024-07-25 NOTE — ED PROVIDER NOTE - CHIEF COMPLAINT
[address]  [place] [address]  Tel.  [phone]  Fax. [phone] [address]  [place] [address]  Tel.  [phone]  Fax. [phone] [address] [address]  Tel.  [phone]  Fax. [phone]     Learning About CPAP for Sleep Apnea  What is CPAP?              CPAP is a small machine that you use at home every night while you sleep. It increases air pressure in your throat to keep your airway open. When you have sleep apnea, this can help you sleep better so you feel much better. CPAP stands for \"continuous positive airway pressure.\"  The CPAP machine will have one of the following:  A mask that covers your nose and mouth  Prongs that fit into your nose  A mask that covers your nose only, the most common type. This type is called NCPAP. The N stands for \"nasal.\"  Why is it done?  CPAP is usually the best treatment for obstructive sleep apnea. It is the first treatment choice and the most widely used. Your doctor may suggest CPAP if you have:  Moderate to severe sleep apnea.  Sleep apnea and coronary artery disease (CAD) or heart failure.  How does it help?  CPAP can help you have more normal sleep, so you feel less sleepy and more alert during the daytime.  CPAP may help keep heart failure or other heart problems from getting worse.  NCPAP may help lower your blood pressure.  If you use CPAP, your bed partner may also sleep better because you are not snoring or restless.  What are the side effects?  Some people who use CPAP have:  A dry or stuffy nose and a sore throat.  Irritated skin on the face.  Sore eyes.  Bloating.  If you have any of these problems, work with your doctor to fix them. Here are some things you can try:  Be sure the mask or nasal prongs fit well.  See if your doctor can adjust the pressure of your CPAP.  If your nose is dry, try a humidifier.  If your nose is runny or stuffy, try decongestant medicine or a steroid  The patient is a 39y Female complaining of

## 2024-10-01 NOTE — ED PROVIDER NOTE - GASTROINTESTINAL NEGATIVE STATEMENT, MLM
"Right Radial Arterial Puncture/Cannulation    Date/Time: 9/30/2024 8:37 PM    Performed by: ASIM Live  Authorized by: [employer]  Consent: The procedure was performed in an emergent situation.  Relevant documents: relevant documents present and verified  Test results: test results available and properly labeled  Site marked: the operative site was marked  Imaging studies: imaging studies available  Patient identity confirmed: arm band  Time out: Immediately prior to procedure a \"time out\" was called to verify the correct patient, procedure, equipment, support staff and site/side marked as required.  Preparation: Patient was prepped and draped in the usual sterile fashion.  Local anesthesia used: yes  Anesthesia: local infiltration    Anesthesia:  Local anesthesia used: yes  Local Anesthetic: lidocaine 1% without epinephrine  Anesthetic total: 2 mL    Sedation:  Patient sedated: no    Patient tolerance: patient tolerated the procedure well with no immediate complications      Pt with shock of unknown origin on dual IV pressors with NE and Vasopressin requiring close hemodynamic monitoring. A time out was preformed identifying the correct procedure, the correct location with the nursing staff.  The right wrist was prepped with 2% chlorhexidine and draped with a sterile sheet in the usual fashion. 1% lidocaine was administered subcutaneously for local anesthesia. There right radial artery was cannulated and a catheter was placed over a wire, with return of pulsatile red blood. The catheter was secured in place and a sterile dressing was applied over the site prior to removal of drapes. The line flushes and draws back blood easily. The catheter was attached to a monitor, which revealed an appropriate arterial waveform.           " no abdominal pain, no bloating, no constipation, no diarrhea, no nausea and no vomiting.

## 2024-10-21 NOTE — ED ADULT TRIAGE NOTE - BMI (KG/M2)
"10/21/2024      Jefferson Cassidy  [address] [facility] [address]      Dear Colleague,    Thank you for referring your patient, eJfferson Cassidy, to the [facility]. Please see a copy of my visit note below.    Transplant Coordinator Note    Reason for visit: Post lung transplant follow up visit   Coordinator: Present   Caregiver:  wife, Jacey    Health concerns addressed today:  1. Respiratory - since discharge from hospital, doing \"fairly well\". Last 2-3 days more tired, not sleeping as well. Taking Trazadone NOC. Last few days developed dry cough - wakes patient up and can't get back to sleep. Cough is day and NOC - more NOC, occurring more frequently.  Getting winded with activities - same as 3-4 weeks ago.   2. GI: appetite starts out good but then dwindles as the day goes on. Has not eaten today but has a  full feeling in stomach. No nausea, vomiting, diarrhea.   3.  No fever, chills, night sweats.   4. Cardiac - no chest pain/pressure, no fast heart beat/fluttering in chest.   5. BLE feet/ankle swelling.   6. Home BPs 120-140/70-90s.     Activity/rehab: Cardiac rehab   Oxygen needs: RA  Pain management/RX: N/A  Diabetic management: N/A  Next Bronch due: Two weeks  Risk Criteria Labs: Completed 6/12/24 and negative  CMV status: D+/R+  Valcyte stopped: through POD 90  EBV status: D+/R+, monitor monthly (next due 8/11)  DVT/PE:  Post op AFIB/follow up with EP:  AC/asa: aspirin  PJP prophylactic: dapsone     COVID:  COVID-19 infection (yes/no, date of most recent positive test):   Status/instructions given about COVID-19 vaccine:      Pt Education: medications (use/dose/side effects), how/when to call coordinator, frequency of labs, s/s of infection/rejection, call prior to starting any new medications, lab/vital sign book     Health Maintenance:   Last colonoscopy: 7/2020 - 3 mm tubular adenoma resected from transverse colon, 3 mm tubular adenoma resected from descending colon. [place] " Multi-Society Task Force recommends repeat surveillance colonoscopy in 7-10 years for 1-2 tubular adenomas < 10 mm  Next colonoscopy due: July 2027?   Dermatology:  Vaccinations this visit:     Labs, CXR, PFTs reviewed with patient  Medication record reviewed and reconciled  Questions and concerns addressed    Patient Instructions  1. Continue to hydrate with 60-70 oz fluids daily.  2. Continue to exercise daily or most days of the week.  3. Follow up with your primary care provider for annual gender health maintenance procedures.  4. Follow up with colonoscopy schedule.  5. Follow up with annual dermatology visits.  6. It doesn't seem like the COVID vaccine is working well in lung transplant patients. A number of lung transplant patients have gotten sick with COVID even after receiving the vaccines. Based on our recent experience, it can be life-threatening to get COVID  even after being vaccinated. Please continue to act like you did not get the COVID vaccine - social distancing, wearing a mask, good hand hygiene, etc. If the people around you are vaccinated, it will help reduce the risk of you getting COVID. All members of your household should be vaccinated.  7. Only use levabuterol nebs as needed (when wheezy, short of breath). Let us know if you need use it. Hold on to the vials you have.   8. Old meds - hold on to fluticasone. Prilosec and pantoprazole are interchangeable.   9. Don't see the dentist until 2025.   10. Take Nystatin (swish and swallow) for 6 months post transplant (through 11/13).   11. Okay for you to take stretch classes again. Make sure they start out gentle.   12. Nasal swab today  13. Increase lasix to 20mg in the AM and 20mg in the afternoon.   14. Labs next week to check kidney function.   15. Try decreasing trazodone to 50mg at bedtime.   16. Atrovent nasal spray - could help with runny nose.   17. Voriconazole through mid December.     Next transplant clinic appointment: 11/11 with  CXR, labs and PFTs before appointment with Dr. 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 Also with history of GERD with presbyesophagus and history of basal cell cancer. Admitted 8/13-8/26/24 with fatigue, confusion, low blood pressures, acute hypoxic respiratory failure, and MARTHA. S/p left thoracentesis in IR 8/14, s/p left chest tube placement in IR 8/19-8/23, and IV meropenem for pneumonia, 2 week course with resolution of hypoxia. The patient was admitted hospitalized 9/16-10/3 for ACR (A2) and AMR, treated with high-dose steroids and carfilzomib protocol. Also with acute on chronic anemia and AKD on CKD.  This is the patient's first clinic visit since discharge.      Pulmonary/lung transplant: The patient was hospitalized in August with pneumonia and then in September for treatment of ACR (A2) and AMR.  The patient has ongoing dyspnea with moderate exertion.  Persistent cough, increased in the past couple of weeks although no sputum production.  Diminished airflow in the right lower lung field.  Oxygenating well with saturation 97% on room air at rest.  PFTs with a restrictive ventilatory defect minimally improved from August and below his post transplant best in July.  Chest CT, reviewed by me, based on the shape of the trachea, this appears to  be an expiratory CT making it difficult to interpret or compare, also motion artifact at the base.  There is diffuse groundglass changes, most prominent in the upper lung fields which may be a reflection of the expiratory film although could represent pulmonary edema or less likely an infectious process.  There are small-moderate bilateral loculated effusions, greater on the left than the right, increase in the size of the left-sided effusion compared to August.  BNP will be added to this morning's labs to assess for component of volume overload.  Lasix will be increased to twice daily to address potential component of volume overload with close monitoring of creatinine.  Check nasal swabs for COVID and respiratory PCR to assess for infectious causes of groundglass changes and increased cough.  Tacrolimus will be adjusted to maintain a level of 6-8 to reduce nephrotoxicity.  If renal function improves, strongly consider increasing goal back to 8-10 in view of recent AMR and ACR.  Continue prednisone 10 mg daily.  Continue Myfortic 180 mg twice daily, reduced dose due to ongoing leukopenia.  If no improvement in the left-sided effusion with diuresis, could consider repeat thoracentesis.  Repeat bronchoscopy and biopsies should be considered in the next 2-4 weeks to follow-up the A2 rejection.    DSA: Rising DQ B7 with falling PFTs.  Admitted 9/16-10/3 for high-dose steroids and carfilzomib protocol.  Continue monthly IVIG for at least 3 months.  Continue monitoring DSA.  Date DSA mfi Biopsy (date) Treatment   5/13/2024 None         5/20/2024 None         6/12/2024 DQB7 9014       6/26/2024 DQB7 6702       7/11/2024 DQB7 5305       7/18/2024 DQB7 5612       7/23/2024 DQB7 6300    IV immunoglobulin monthly x 3   8/6/2024 DQB7 7187     8/14/2024 DQB7 7677     8/29/2014 DQB7 9788     9/10/2024 DQB7 8874 9/11 A2B0 High dose steroids Carfilzomib/PLEX/IgG 9/16-10/3   10/1/2024 DQB7 2196         Prophylaxis: Continue dapsone  for PJP prophylaxis.  ([facility] pharmacy suggested possible switch to pentamadine if ongoing anemia.) the patient received letermovir for CMV prophylaxis and minocycline due to history of Burkholderia with the AMR and ACR treatment.  It appears these were discontinued by ID.  Monitor for CMV reactivation every 2 weeks.  Continue voriconazole through mid December for fungal prophylaxis due to granuloma noted on CT and right upper lobe of the transplant lung.      Donor Recipient Intervention    CMV status Positive Positive VGCV vs letermovir POD #8-90   EBV status Positive Positive EBV monthly (7/11 negative)   HSV status N/A Positive S/p ACV 6/7-6/12 (after VGCV d/c)       Insomnia: The patient reports a daytime fatigue if taking trazodone 100 mg, trial of trazodone 50 mg recommended.    Infectious disease:  Burkholderia Gladioli-treated with Zosyn and then meropenem as well as minocycline and inhaled amikacin during the transplant hospitalization.  Minocycline prophylaxis during therapy for AMR and ACR.  High risk organism.  Follow closely for recurrence.  Donor RUL calcified granuloma-noted on CT.  Fungal infectious workup unrevealing.  Continue voriconazole through mid December following treatment for AMR and ACR.  Include fungal cultures and galactomannan on all bronchoscopy specimens.    CMV viremia: Low-level CMV viremia in August.  Treated with letermovir prophylaxis during AMR and ACR treatment.  Continue monitoring closely for reactivation.    GERD: Adequately controlled with current pantoprazole.    Anemia, leukopenia: Persistent anemia and leukopenia.  Reduced dose Myfortic due to low counts.  May consider switching from dapsone to pentamadine if anemia persists.  Continue close monitoring.    MARTHA/CKD: Recent post transplant creatinine has varied widely from 1.1-2.66, multifactorial including CNI and carfilzomib.  Tacrolimus goal has been reduced in an effort to limit nephrotoxicity but this must be done  cautiously in view of the patient's recent ACR and AMR.  Continue efforts at diuresis and monitor creatinine closely.    Hypomagnesemia: Magnesium level is low at 1.5.  Persistently decreased, mag replacement will be increased.    ASCVD: Status post CABG x 3 (LAD, diagonal, OM) at the time of transplant.  Continue aspirin, rosuvastatin and Lasix.    Hemodynamics: The patient had been on propranolol, Lasix and Cardura for hypotension.  Subsequent difficulty with hypotension requiring midodrine which has since resolved.  Now on metoprolol with mildly elevated blood pressure.  Continue home monitoring and adjust as needed.    Incidental bilateral subdural hemorrhages: Noted on head CT on 5/21.  Unchanged on repeat CT 5/28.  Resolved on head CT 8/14.    Tremors: Not addressed with today's visit.  Could consider primidone or switch to inversus.    Healthcare maintenance: The patient has received his influenza vaccine for this flu season.  Overdue for COVID revaccination.  Follow-up 2-3 weeks with labs, PFTs and chest x-ray    IJ Carlos, have spent 75 minutes on the day of the visit to review the chart, interview and examine the patient, review labs and imaging, formulate a plan, document and submit orders. Time documented is excluding time spent for PFT interpretation.    The longitudinal plan of care for the diagnosis(es)/condition(s) as documented were addressed during this visit. Due to the added complexity in care, I will continue to support Fran in the subsequent management and with ongoing continuity of care.      J Carlos Hannah MD  Contact via [employer]    Note: Chart documentation done in part with Dragon Voice Recognition software. Although reviewed after completion, some word and grammatical errors may remain. Please consider this when interpreting information in this chart.     Lung TX HPI  Transplants:  5/13/2024 (Lung), Postoperative day:  163    The patient was seen and examined by J Carlos Hannah,  MD   The patient was hospitalized 9/16-10/3 for high-dose steroids and the carfilzomib protocol for ACR and AMR.  This is the first clinic visit since that admission.  The patient reports ongoing fatigue.  He is sleeping poorly the last few days.  He wakes at 4 AM and cannot fall back to sleep.  Breathing is comfortable at rest.  Dyspnea with mild to moderate exertion, unchanged in the past couple of weeks.  He reports a cough for the past 3-4 weeks.  Dry cough.  Now increased in frequency for the past 1-2 weeks.  No sputum production.  He does note clear rhinorrhea.  No chest pain.  No fever, chills or night sweats.  Possibly some postnasal drip.    Review of systems:  He reports feeling full even when not eating.  Appetite is fair.  No ear pain, sore throat or sinus pain.  Floaters, unchanged, some visual blurring but no blind spots or double vision.  Mild nausea but no vomiting.  No change in stools.  Persistent swelling in the feet and ankles.  A complete ROS was otherwise negative except as noted in the HPI.    Current Outpatient Medications   Medication Sig Dispense Refill     levalbuterol (XOPENEX) 1.25 MG/3ML neb solution Take 3 mLs (1.25 mg) by nebulization 2 times daily as needed for shortness of breath or wheezing.       polyethylene glycol (MIRALAX) 17 GM/Dose powder Take 17 g by mouth daily as needed for constipation.       acetaminophen (TYLENOL) 325 MG tablet Take 2 tablets (650 mg) by mouth every 6 hours as needed for fever or mild pain.       artificial tears OINT ophthalmic ointment Place 1 g into both eyes 2 times daily. 42 g 0     aspirin (ASA) 81 MG chewable tablet Take 2 tablets (162 mg) by mouth daily 60 tablet 0     calcium carbonate-vitamin D (CALTRATE) 600-10 MG-MCG per tablet Take 1 tablet by mouth 2 times daily (with meals) 60 tablet 0     carboxymethylcellulose PF (REFRESH PLUS) 0.5 % ophthalmic solution Place 1 drop into both eyes 3 times daily. 50 each 0     cyanocobalamin  (CYANOCOBALAMIN) 1000 MCG tablet 1 tablet (1,000 mcg) by Oral or Feeding Tube route daily       dapsone (ACZONE) 25 MG tablet Take 2 tablets (50 mg) by mouth daily. At Noon 60 tablet 0     furosemide (LASIX) 20 MG tablet Take 1 tablet (20 mg) by mouth 2 times daily. 20 tablet 0     magnesium glycinate 100 MG CAPS capsule Take 3 capsules (300 mg) by mouth 2 times daily       metoprolol tartrate (LOPRESSOR) 25 MG tablet Take 1 tablet (25 mg) by mouth 2 times daily. 180 tablet 3     multivitamin, therapeutic (THERA-VIT) TABS tablet Take 1 tablet by mouth daily       MYFORTIC (BRAND) 180 MG EC tablet Take 1 tablet (180 mg) by mouth 2 times daily. 60 tablet 0     nystatin (MYCOSTATIN) 665984 UNIT/ML suspension Take 10 mLs (1,000,000 Units) by mouth 4 times daily.       ondansetron (ZOFRAN ODT) 4 MG ODT tab Take 1 tablet (4 mg) by mouth every 6 hours as needed for nausea or vomiting 90 tablet 3     pantoprazole (PROTONIX) 40 MG EC tablet Take 1 tablet (40 mg) by mouth 2 times daily (before meals). 180 tablet 3     predniSONE (DELTASONE) 5 MG tablet Take 2 tablets (10 mg) by mouth daily. 60 tablet 0     rosuvastatin (CRESTOR) 20 MG tablet Take 1 tablet (20 mg) by mouth every evening 90 tablet 3     tacrolimus (GENERIC) 1 mg/mL suspension Total Dose: 0.5 mL in AM and 0.4mL in PM 30 mL 11     traZODone (DESYREL) 50 MG tablet Take  mg by mouth nightly as needed for sleep.       voriconazole (VFEND) 50 MG tablet Take 7 tablets (350 mg) by mouth 2 times daily. 420 tablet 11     No current facility-administered medications for this visit.     Allergies   Allergen Reactions     Blood-Group Specific Substance Other (See Comments)     Patient has a history of a clinically significant antibody against RBC antigens.  A delay in compatible RBCs may occur.     Sulfa Antibiotics      PN: Unknown Reaction, childhood allergy     Past Medical History:   Diagnosis Date     Basal cell carcinoma 06/15/2009     Immunosuppression (H)  07/05/2024       Past Surgical History:   Procedure Laterality Date     BRONCHOSCOPY (RIGID OR FLEXIBLE), DIAGNOSTIC N/A 06/28/2024    Procedure: BRONCHOSCOPY, WITH BRONCHOALVEOLAR LAVAGE;  Surgeon: Meghann Ray MD;  Location:  GI     BRONCHOSCOPY (RIGID OR FLEXIBLE), DIAGNOSTIC N/A 09/11/2024    Procedure: BRONCHOSCOPY, WITH BRONCHOALVEOLAR LAVAGE AND BIOPSIES;  Surgeon: Osei Corea MD;  Location: [facility] GI     BYPASS GRAFT ARTERY CORONARY N/A 05/13/2024    Procedure: Median Sternotomy, Cardiopulmonary Bypass, Endoscopic Bilateral Greater Saphenous Vein [place], Bypass graft artery coronary x 3, Transesophageal Echocardiogram by Anesthesia;  Surgeon: Vernon Morris MD;  Location: [facility] OR     CV CORONARY ANGIOGRAM N/A 04/29/2024    Procedure: Coronary Angiogram;  Surgeon: Nickolas Rodríguez MD;  Location: [facility] HE[facility] CARDIAC CATH LAB     CV RIGHT HEART CATH MEASUREMENTS RECORDED N/A 04/29/2024    Procedure: Right Heart Catheterization;  Surgeon: Nickolas Rodríguez MD;  [facility]     CV RIGHT HEART CATH MEASUREMENTS RECORDED N/A 08/19/2024    Procedure: Right Heart Catheterization;  Surgeon: Yeo, Ilhwan, MD;  [facility]     ESOPHAGOSCOPY, GASTROSCOPY, DUODENOSCOPY (EGD), COMBINED N/A 05/06/2024    Procedure: Esophagoscopy, gastroscopy, duodenoscopy (EGD), combined;  Surgeon: David Degroot MD;  Location: [facility] GI     IR CHEST TUBE PLACEMENT NON-TUNNELED RIGHT  05/22/2024     IR CHEST TUBE PLACEMENT NON-TUNNELED RIGHT  06/10/2024     IR CHEST TUBE PLACEMENT NON-TUNNELLED LEFT  08/19/2024     IR CVC NON TUNNEL LINE REMOVAL  10/1/2024     IR CVC NON TUNNEL PLACEMENT > 5 YRS  09/16/2024     IR THORACENTESIS  05/22/2024     IR THORACENTESIS  08/14/2024     PICC DOUBLE LUMEN PLACEMENT Right 06/16/2024    Right basilic vein 42cm total 1cm external.     PICC DOUBLE LUMEN PLACEMENT Left 09/16/2024    Left basilic vein 48cm total 3cm  external.     TRACHEOSTOMY N/A 06/17/2024    Procedure: Tracheostomy, open trach;  Surgeon: Jamaica Alanis MD;  Location: [facility] OR     TRANSPLANT LUNG RECIPIENT SINGLE X2 Bilateral 05/13/2024    Procedure: Bilateral Lung Transplant, Intra-operative Flexible Bronchoscopy;  Surgeon: Vernon Morris MD;  Location: [facility] OR       Social History     Socioeconomic History     Marital status:      Spouse name: Not on file     Number of children: Not on file     Years of education: Not on file     Highest education level: Not on file   Occupational History     Not on file   Tobacco Use     Smoking status: Never     Passive exposure: Past     Smokeless tobacco: Never     Tobacco comments:     Father smoked when he was kid.   Substance and Sexual Activity     Alcohol use: Not Currently     Comment: socially-last use Jan 1 2024     Drug use: Never     Sexual activity: Not on file   Other Topics Concern     Not on file   Social History Narrative     Not on file     Social Drivers of Health     Financial Resource Strain: Low Risk  (9/16/2024)    Financial Resource Strain      Within the past 12 months, have you or your family members you live with been unable to get utilities (heat, electricity) when it was really needed?: No   Food Insecurity: Low Risk  (9/16/2024)    Food Insecurity      Within the past 12 months, did you worry that your food would run out before you got money to buy more?: No      Within the past 12 months, did the food you bought just not last and you didn t have money to get more?: No   Transportation Needs: Low Risk  (9/16/2024)    Transportation Needs      Within the past 12 months, has lack of transportation kept you from medical appointments, getting your medicines, non-medical meetings or appointments, work, or from getting things that you need?: No   Physical Activity: Not on file   Stress: Not on file   Social Connections: Not on file   Interpersonal Safety: Low Risk  (9/20/2024)     "Interpersonal Safety      Do you feel physically and emotionally safe where you currently live?: Yes      Within the past 12 months, have you been hit, slapped, kicked or otherwise physically hurt by someone?: No      Within the past 12 months, have you been humiliated or emotionally abused in other ways by your partner or ex-partner?: No   Housing Stability: High Risk (9/16/2024)    Housing Stability      Do you have housing? : No      Are you worried about losing your housing?: No         BP (!) 154/80 (BP Location: Left arm, Patient Position: Sitting, Cuff Size: Adult Regular)   Pulse 109   Temp 99  F (37.2  C) (Oral)   Resp (!) 34   Ht 1.727 m (5' 8\")   Wt 66.5 kg (146 lb 8 oz)   SpO2 97%   BMI 22.28 kg/m    Body mass index is 22.28 kg/m .  Exam:   GENERAL APPEARANCE: Well developed, thin, alert, and in no apparent distress.  EYES: PERRL, EOMI  HENT: Nasal mucosa with no edema and no hyperemia. No nasal polyps.  EARS: Canals clear, TMs normal  MOUTH: Oral mucosa is moist, without any lesions, no tonsillar enlargement, no oropharyngeal exudate.  NECK: supple, no masses, no thyromegaly.  LYMPHATICS: No significant axillary, cervical, or supraclavicular nodes.  RESP: normal percussion, decreased airflow at the right base.  No crackles. No rhonchi. No wheezes.  CV: Normal S1, S2, regular rhythm, normal rate. No murmur.  No rub. No gallop. 1+  LE edema.   ABDOMEN:  Bowel sounds normal, soft, nontender, no HSM or masses.   MS: extremities normal. No clubbing. No cyanosis.  SKIN: no rash on limited exam  NEURO: Mentation intact, speech normal, normal strength and tone, normal gait and stance  PSYCH: mentation appears normal. and affect normal/bright  Results:  Recent Results (from the past week)   CMV Quantitative, PCR (Blood)    Collection Time: 10/17/24  8:39 AM    Specimen: Arm, Left; Blood   Result Value Ref Range    CMV DNA IU/mL Not Detected Not Detected IU/mL   [facility] Immune Cell Function    Collection " Time: 10/17/24  8:39 AM   Result Value Ref Range    [employer] Immune Cell Function 134 ng/mL   Art Barr Virus Quantitative PCR, Plasma    Collection Time: 10/17/24  8:39 AM    Specimen: Arm, Left; Blood   Result Value Ref Range    EBV DNA IU/mL Not Detected Not Detected IU/mL   Tacrolimus by Tandem Mass Spectrometry    Collection Time: 10/17/24  8:39 AM   Result Value Ref Range    Tacrolimus by Tandem Mass Spectrometry 6.2 5.0 - 15.0 ug/L    Tacrolimus Last Dose Date 10/16/2024     Tacrolimus Last Dose Time  8:00 PM    CBC with platelets    Collection Time: 10/17/24  8:39 AM   Result Value Ref Range    WBC Count 2.1 (L) 4.0 - 11.0 10e3/uL    RBC Count 2.47 (L) 4.40 - 5.90 10e6/uL    Hemoglobin 8.6 (L) 13.3 - 17.7 g/dL    Hematocrit 27.2 (L) 40.0 - 53.0 %     (H) 78 - 100 fL    MCH 34.8 (H) 26.5 - 33.0 pg    MCHC 31.6 31.5 - 36.5 g/dL    RDW 20.0 (H) 10.0 - 15.0 %    Platelet Count 155 150 - 450 10e3/uL   Comprehensive metabolic panel    Collection Time: 10/17/24  8:39 AM   Result Value Ref Range    Sodium 137 135 - 145 mmol/L    Potassium 4.3 3.4 - 5.3 mmol/L    Carbon Dioxide (CO2) 26 22 - 29 mmol/L    Anion Gap 10 7 - 15 mmol/L    Urea Nitrogen 24.1 (H) 8.0 - 23.0 mg/dL    Creatinine 1.48 (H) 0.67 - 1.17 mg/dL    GFR Estimate 51 (L) >60 mL/min/1.73m2    Calcium 9.2 8.8 - 10.4 mg/dL    Chloride 101 98 - 107 mmol/L    Glucose 113 (H) 70 - 99 mg/dL    Alkaline Phosphatase 54 40 - 150 U/L    AST 18 0 - 45 U/L    ALT 16 0 - 70 U/L    Protein Total 6.2 (L) 6.4 - 8.3 g/dL    Albumin 3.7 3.5 - 5.2 g/dL    Bilirubin Total 0.3 <=1.2 mg/dL   Magnesium    Collection Time: 10/17/24  8:39 AM   Result Value Ref Range    Magnesium 1.5 (L) 1.7 - 2.3 mg/dL   INR    Collection Time: 10/17/24  8:39 AM   Result Value Ref Range    INR 1.12 0.85 - 1.15   CBC with platelets and differential    Collection Time: 10/17/24  8:39 AM   Result Value Ref Range    WBC Count 2.3 (L) 4.0 - 11.0 10e3/uL    RBC Count 2.49 (L) 4.40 - 5.90  10e6/uL    Hemoglobin 8.6 (L) 13.3 - 17.7 g/dL    Hematocrit 27.5 (L) 40.0 - 53.0 %     (H) 78 - 100 fL    MCH 34.5 (H) 26.5 - 33.0 pg    MCHC 31.3 (L) 31.5 - 36.5 g/dL    RDW 20.0 (H) 10.0 - 15.0 %    Platelet Count 165 150 - 450 10e3/uL    % Neutrophils 75 %    % Lymphocytes 16 %    % Monocytes 5 %    % Eosinophils 3 %    % Basophils 0 %    % Immature Granulocytes 1 %    NRBCs per 100 WBC 0 <1 /100    Absolute Neutrophils 1.7 1.6 - 8.3 10e3/uL    Absolute Lymphocytes 0.4 (L) 0.8 - 5.3 10e3/uL    Absolute Monocytes 0.1 0.0 - 1.3 10e3/uL    Absolute Eosinophils 0.1 0.0 - 0.7 10e3/uL    Absolute Basophils 0.0 0.0 - 0.2 10e3/uL    Absolute Immature Granulocytes 0.0 <=0.4 10e3/uL    Absolute NRBCs 0.0 10e3/uL   Tacrolimus by [employer] Mass Spectrometry    Collection Time: 10/21/24  8:47 AM   Result Value Ref Range    Tacrolimus by [employer] Mass Spectrometry 10.0 5.0 - 15.0 ug/L    Tacrolimus Last Dose Date 10/20/2024     Tacrolimus Last Dose Time  8:45 PM    Basic metabolic panel    Collection Time: 10/21/24  8:47 AM   Result Value Ref Range    Sodium 135 135 - 145 mmol/L    Potassium 5.1 3.4 - 5.3 mmol/L    Chloride 100 98 - 107 mmol/L    Carbon Dioxide (CO2) 27 22 - 29 mmol/L    Anion Gap 8 7 - 15 mmol/L    Urea Nitrogen 29.3 (H) 8.0 - 23.0 mg/dL    Creatinine 1.82 (H) 0.67 - 1.17 mg/dL    GFR Estimate 39 (L) >60 mL/min/1.73m2    Calcium 9.6 8.8 - 10.4 mg/dL    Glucose 144 (H) 70 - 99 mg/dL   Magnesium    Collection Time: 10/21/24  8:47 AM   Result Value Ref Range    Magnesium 1.5 (L) 1.7 - 2.3 mg/dL   INR    Collection Time: 10/21/24  8:47 AM   Result Value Ref Range    INR 1.15 0.85 - 1.15   CBC with platelets and differential    Collection Time: 10/21/24  8:47 AM   Result Value Ref Range    WBC Count 3.6 (L) 4.0 - 11.0 10e3/uL    RBC Count 2.56 (L) 4.40 - 5.90 10e6/uL    Hemoglobin 8.9 (L) 13.3 - 17.7 g/dL    Hematocrit 27.4 (L) 40.0 - 53.0 %     (H) 78 - 100 fL    MCH 34.8 (H) 26.5 - 33.0 pg    MCHC 32.5  31.5 - 36.5 g/dL    RDW 19.9 (H) 10.0 - 15.0 %    Platelet Count 231 150 - 450 10e3/uL    % Neutrophils 79 %    % Lymphocytes 13 %    % Monocytes 5 %    % Eosinophils 1 %    % Basophils 1 %    % Immature Granulocytes 1 %    NRBCs per 100 WBC 0 <1 /100    Absolute Neutrophils 2.8 1.6 - 8.3 10e3/uL    Absolute Lymphocytes 0.5 (L) 0.8 - 5.3 10e3/uL    Absolute Monocytes 0.2 0.0 - 1.3 10e3/uL    Absolute Eosinophils 0.1 0.0 - 0.7 10e3/uL    Absolute Basophils 0.0 0.0 - 0.2 10e3/uL    Absolute Immature Granulocytes 0.0 <=0.4 10e3/uL    Absolute NRBCs 0.0 10e3/uL   General PFT Lab (Please always keep checked)    Collection Time: 10/21/24  9:07 AM   Result Value Ref Range    FVC-Pred 3.64 L    FVC-Pre 1.68 L    FVC-%Pred-Pre 46 %    FEV1-Pre 1.27 L    FEV1-%Pred-Pre 45 %    FEV1FVC-Pred 77 %    FEV1FVC-Pre 75 %    FEFMax-Pred 7.88 L/sec    FEFMax-Pre 3.95 L/sec    FEFMax-%Pred-Pre 50 %    FEF2575-Pred 2.20 L/sec    FEF2575-Pre 0.93 L/sec    MGI5003-%Pred-Pre 42 %    ExpTime-Pre 4.63 sec    FIFMax-Pre 3.28 L/sec    FEV1FEV6-Pred 78 %    FEV1FEV6-Pre 75 %   Respiratory Panel PCR    Collection Time: 10/21/24 12:19 PM    Specimen: Nasopharyngeal; Swab   Result Value Ref Range    Adenovirus Not Detected Not Detected    Coronavirus Not Detected Not Detected    Human Metapneumovirus Not Detected Not Detected    Human Rhin/Enterovirus Not Detected Not Detected    Influenza A Not Detected Not Detected    Influenza A, H1 Not Detected Not Detected    Influenza A 2009 H1N1 Not Detected Not Detected    Influenza A, H3 Not Detected Not Detected    Influenza B Not Detected Not Detected    Parainfluenza Virus 1 Not Detected Not Detected    Parainfluenza Virus 2 Not Detected Not Detected    Parainfluenza Virus 3 Not Detected Not Detected    Parainfluenza Virus 4 Not Detected Not Detected    Respiratory Syncytial Virus A Not Detected Not Detected    Respiratory Syncytial Virus B Not Detected Not Detected    Chlamydia Pneumoniae Not Detected  Not Detected    Mycoplasma Pneumoniae Not Detected Not Detected   Symptomatic COVID-19 Virus (Coronavirus) by PCR Nasopharyngeal    Collection Time: 10/21/24 12:20 PM    Specimen: Nasopharyngeal; Swab   Result Value Ref Range    SARS CoV2 PCR Negative Negative            Results as noted above.    PFT Interpretation:  Restrictive ventilatory defect.  Increased from previous.  Below recent best.   Valid Maneuver                  Again, thank you for allowing me to participate in the care of your patient.        Sincerely,        J Carlos Hannah MD   35.2